# Patient Record
Sex: MALE | Race: OTHER | NOT HISPANIC OR LATINO | Employment: FULL TIME | URBAN - METROPOLITAN AREA
[De-identification: names, ages, dates, MRNs, and addresses within clinical notes are randomized per-mention and may not be internally consistent; named-entity substitution may affect disease eponyms.]

---

## 2020-10-23 ENCOUNTER — APPOINTMENT (OUTPATIENT)
Dept: RADIOLOGY | Age: 25
End: 2020-10-23
Payer: COMMERCIAL

## 2020-10-23 ENCOUNTER — OFFICE VISIT (OUTPATIENT)
Dept: URGENT CARE | Age: 25
End: 2020-10-23
Payer: COMMERCIAL

## 2020-10-23 VITALS
HEIGHT: 63 IN | SYSTOLIC BLOOD PRESSURE: 125 MMHG | RESPIRATION RATE: 16 BRPM | HEART RATE: 81 BPM | TEMPERATURE: 97.8 F | WEIGHT: 129 LBS | OXYGEN SATURATION: 100 % | BODY MASS INDEX: 22.86 KG/M2 | DIASTOLIC BLOOD PRESSURE: 51 MMHG

## 2020-10-23 DIAGNOSIS — M79.672 PAIN IN BOTH FEET: Primary | ICD-10-CM

## 2020-10-23 DIAGNOSIS — M79.672 PAIN IN BOTH FEET: ICD-10-CM

## 2020-10-23 DIAGNOSIS — M25.571 BILATERAL ANKLE PAIN, UNSPECIFIED CHRONICITY: ICD-10-CM

## 2020-10-23 DIAGNOSIS — M79.671 PAIN IN BOTH FEET: Primary | ICD-10-CM

## 2020-10-23 DIAGNOSIS — M25.572 BILATERAL ANKLE PAIN, UNSPECIFIED CHRONICITY: ICD-10-CM

## 2020-10-23 DIAGNOSIS — M79.671 PAIN IN BOTH FEET: ICD-10-CM

## 2020-10-23 PROCEDURE — 73630 X-RAY EXAM OF FOOT: CPT

## 2020-10-23 PROCEDURE — G0382 LEV 3 HOSP TYPE B ED VISIT: HCPCS | Performed by: PHYSICIAN ASSISTANT

## 2020-10-23 PROCEDURE — 73610 X-RAY EXAM OF ANKLE: CPT

## 2020-10-23 RX ORDER — IBUPROFEN 600 MG/1
600 TABLET ORAL EVERY 6 HOURS PRN
Qty: 30 TABLET | Refills: 0 | Status: SHIPPED | OUTPATIENT
Start: 2020-10-23

## 2021-02-27 ENCOUNTER — OFFICE VISIT (OUTPATIENT)
Dept: URGENT CARE | Age: 26
End: 2021-02-27
Payer: COMMERCIAL

## 2021-02-27 VITALS
DIASTOLIC BLOOD PRESSURE: 67 MMHG | WEIGHT: 136 LBS | HEIGHT: 63 IN | OXYGEN SATURATION: 98 % | HEART RATE: 92 BPM | RESPIRATION RATE: 18 BRPM | BODY MASS INDEX: 24.1 KG/M2 | SYSTOLIC BLOOD PRESSURE: 127 MMHG | TEMPERATURE: 97 F

## 2021-02-27 DIAGNOSIS — Z02.4 DRIVER'S PERMIT PHYSICAL EXAMINATION: Primary | ICD-10-CM

## 2021-02-27 NOTE — PROGRESS NOTES
[address]        NAME: Mike Maldonado is a 26 y o  male  : 1995    MRN: [id number]  DATE: 2021  TIME: 12:21 PM    Assessment and Plan   's permit physical examination [Z02 4]  1  's permit physical examination           Patient Instructions         Chief Complaint     Chief Complaint   Patient presents with    Annual Exam     LE[employer] PERMIT          History of Present Illness       Pt presents for drivers permit physical   She answers no to all ROS questions      Review of Systems   Review of Systems   Constitutional: Negative  Negative for chills, diaphoresis, fatigue, fever and unexpected weight change  HENT: Negative  Negative for hearing loss, nosebleeds, sinus pressure, sinus pain, sore throat, trouble swallowing and voice change  Eyes: Negative  Negative for visual disturbance  Respiratory: Negative  Negative for chest tightness, shortness of breath and wheezing  Cardiovascular: Negative  Negative for chest pain and palpitations  Gastrointestinal: Negative  Negative for abdominal pain, diarrhea, nausea and vomiting  Endocrine: Negative  Genitourinary: Negative  Negative for dysuria  Musculoskeletal: Negative  Negative for back pain and neck pain  Skin: Negative  Negative for pallor and rash  Allergic/Immunologic: Negative  Neurological: Negative  Negative for dizziness, seizures, syncope, weakness, light-headedness, numbness and headaches  Hematological: Negative  Psychiatric/Behavioral: Negative            Current Medications       Current Outpatient Medications:     ibuprofen (MOTRIN) 600 mg tablet, Take 1 tablet (600 mg total) by mouth every 6 (six) hours as needed for mild pain (Patient not taking: Reported on 2021), Disp: 30 tablet, Rfl: 0    Current Allergies     Allergies as of 2021    (No Known Allergies)            The following portions of the patient's history were reviewed and updated as appropriate: allergies, current medications, past family history, past medical history, past social history, past surgical history and problem list      No past medical history on file  Past Surgical History:   Procedure Laterality Date    CLUB FOOT RELEASE         No family history on file  Medications have been verified  Objective   /67   Pulse 92   Temp (!) 97 °F (36 1 °C)   Resp 18   Ht 5' 3" (1 6 m)   Wt 61 7 kg (136 lb)   SpO2 98%   BMI 24 09 kg/m²        Physical Exam     Physical Exam  Vitals signs and nursing note reviewed  Constitutional:       General: He is not in acute distress  Appearance: He is well-developed  He is not diaphoretic  HENT:      Head: Normocephalic and atraumatic  Right Ear: External ear normal       Left Ear: External ear normal       Nose: Nose normal       Mouth/Throat:      Pharynx: No oropharyngeal exudate  Eyes:      Conjunctiva/sclera: Conjunctivae normal       Pupils: Pupils are equal, round, and reactive to light  Neck:      Musculoskeletal: Normal range of motion and neck supple  Cardiovascular:      Rate and Rhythm: Normal rate and regular rhythm  Heart sounds: Normal heart sounds  Pulmonary:      Effort: Pulmonary effort is normal  No respiratory distress  Breath sounds: Normal breath sounds  No wheezing or rales  Musculoskeletal: Normal range of motion  General: No tenderness or deformity  Lymphadenopathy:      Cervical: No cervical adenopathy  Skin:     General: Skin is warm  Capillary Refill: Capillary refill takes less than 2 seconds  Coloration: Skin is not pale  Findings: No rash  Neurological:      Mental Status: He is alert and oriented to person, place, and time  Cranial Nerves: No cranial nerve deficit  Sensory: No sensory deficit  Motor: No abnormal muscle tone        Coordination: Coordination normal

## 2021-05-18 ENCOUNTER — OFFICE VISIT (OUTPATIENT)
Dept: URGENT CARE | Age: 26
End: 2021-05-18

## 2021-05-18 VITALS
HEIGHT: 63 IN | HEART RATE: 94 BPM | WEIGHT: 124 LBS | DIASTOLIC BLOOD PRESSURE: 58 MMHG | RESPIRATION RATE: 16 BRPM | OXYGEN SATURATION: 98 % | BODY MASS INDEX: 21.97 KG/M2 | SYSTOLIC BLOOD PRESSURE: 113 MMHG | TEMPERATURE: 98 F

## 2021-05-18 DIAGNOSIS — S62.356A CLOSED NONDISPLACED FRACTURE OF SHAFT OF FIFTH METACARPAL BONE OF RIGHT HAND, INITIAL ENCOUNTER: Primary | ICD-10-CM

## 2021-05-18 PROCEDURE — 29125 APPL SHORT ARM SPLINT STATIC: CPT | Performed by: PHYSICIAN ASSISTANT

## 2021-05-18 PROCEDURE — G0382 LEV 3 HOSP TYPE B ED VISIT: HCPCS | Performed by: PHYSICIAN ASSISTANT

## 2021-05-18 RX ORDER — ACETAMINOPHEN AND CODEINE PHOSPHATE 300; 30 MG/1; MG/1
1 TABLET ORAL EVERY 4 HOURS PRN
Qty: 15 TABLET | Refills: 0 | Status: SHIPPED | OUTPATIENT
Start: 2021-05-18 | End: 2021-05-18

## 2021-05-18 RX ORDER — ACETAMINOPHEN AND CODEINE PHOSPHATE 300; 30 MG/1; MG/1
1 TABLET ORAL EVERY 4 HOURS PRN
Qty: 15 TABLET | Refills: 0 | Status: SHIPPED | OUTPATIENT
Start: 2021-05-18

## 2021-05-18 NOTE — PATIENT INSTRUCTIONS
Motrin and/or Tylenol as needed for mild-to-moderate pain   Tylenol No  3 as needed for severe pain   follow-up with Orthopedics   Stay in splint until seen by orthopedics  Follow up with PCP in 3-5 days  Proceed to  ER if symptoms worsen  Hand Fracture   AMBULATORY CARE:   A hand fracture  is a break in a bone in your hand  Common signs and symptoms of a hand fracture:   · Pain or tenderness    · Swelling, bruising, or a bump    · Problems moving your hand    · Hand shape is not normal    · Knuckle looks sunken in    Seek care immediately if:   · You have severe pain that does not get better, even with pain medicine  · Your injured hand or forearm is cold, numb, or pale  · Your cast or splint gets wet, damaged, or comes off  Call your doctor or hand specialist if:   · You have new sores around your cast or splint  · You notice a bad smell coming from under your cast     · You have questions or concerns about your condition or care  Treatment  may include any of the following:  · A cast or splint  on your hand, wrist, and lower arm will prevent movement while your hand heals  · NSAIDs  help decrease swelling and pain or fever  This medicine is available with or without a doctor's order  NSAIDs can cause stomach bleeding or kidney problems in certain people  If you take blood thinner medicine, always ask your healthcare provider if NSAIDs are safe for you  Always read the medicine label and follow directions  · Acetaminophen  decreases pain and fever  It is available without a doctor's order  Ask how much to take and how often to take it  Follow directions  Read the labels of all other medicines you are using to see if they also contain acetaminophen, or ask your doctor or pharmacist  Acetaminophen can cause liver damage if not taken correctly  Do not use more than 4 grams (4,000 milligrams) total of acetaminophen in one day  · Prescription pain medicine  may be given   Ask your healthcare provider how to take this medicine safely  Some prescription pain medicines contain acetaminophen  Do not take other medicines that contain acetaminophen without talking to your healthcare provider  Too much acetaminophen may cause liver damage  Prescription pain medicine may cause constipation  Ask your healthcare provider how to prevent or treat constipation  · Closed reduction  is used to put the bone back into the correct position without surgery  · Open reduction surgery  may be needed to put the bone back into the correct position  Wires, pins, plates, or screws may be used to keep the broken pieces lined up correctly  Manage your symptoms:   · Wear a splint as directed  Do not remove the splint until you follow up with your healthcare provider or hand specialist     · Apply ice  on your hand for 15 to 20 minutes every hour or as directed  Use an ice pack, or put crushed ice in a plastic bag  Cover it with a towel before you apply it to your skin  Ice helps prevent tissue damage and decreases swelling and pain  · Elevate  your hand above the level of your heart as often as you can  This will help decrease swelling and pain  Prop your hand on pillows or blankets to keep it elevated comfortably  · Go to physical therapy as directed  A physical therapist teaches you exercises to help improve movement and strength and to decrease pain  Bathing with a cast or splint:  Your healthcare provider will tell you when it is okay to take a bath or shower  Do not let your cast or splint get wet  Before bathing, cover the cast or splint with a plastic bag  Tape the bag to your skin above the cast or splint to seal out water  Keep your hand out of the water in case the bag breaks or tears  Cast or splint care:   · Check the skin around the cast or splint for redness or sores every day  · Do not push down or lean on any part of the cast or splint      · Do not use a sharp or pointed object to scratch your skin under the cast or splint  Activity:  You may not be able to drive for up to 2 weeks  Ask when it is safe for you to drive and return to other activities, such as sports  Follow up with your doctor or hand specialist as directed: You may need to return to have your cast, splint, or stitches removed  Write down your questions so you remember to ask them during your visits  © Copyright [address] Information is for End User's use only and may not be sold, redistributed or otherwise used for commercial purposes  All illustrations and images included in CareNotes® are the copyrighted property of A D A M , Inc  or [address] [place], [place] [address]kai   The above information is an  only  It is not intended as medical advice for individual conditions or treatments  Talk to your doctor, nurse or pharmacist before following any medical regimen to see if it is safe and effective for you

## 2021-05-18 NOTE — PROGRESS NOTES
[address] [facility]        NAME: Ioana Corey is a 23 y o  male  : 1995    MRN: [id number]  DATE: May 18, 2021  TIME: 6:25 PM    Assessment and Plan   Closed nondisplaced fracture of shaft of fifth metacarpal bone of right hand, initial encounter [S62 356A]  1  Closed nondisplaced fracture of shaft of fifth metacarpal bone of right hand, initial encounter  XR hand 3+ vw right    XR wrist 3+ vw right    Ambulatory referral to Orthopedic Surgery    Splint    Splint application    Sling    acetaminophen-codeine (TYLENOL #3) 300-30 mg per tablet    DISCONTINUED: acetaminophen-codeine (TYLENOL #3) 300-30 mg per tablet         Patient Instructions      Motrin and/or Tylenol as needed for mild-to-moderate pain   Tylenol No  3 as needed for severe pain   follow-up with Orthopedics   Stay in splint until seen by orthopedics  Follow up with PCP in 3-5 days  Proceed to  ER if symptoms worsen  Chief Complaint     Chief Complaint   Patient presents with    Wrist Pain     pt states his daughter locked herself in a room and he broke the doorknob to get to her, and he injured his right wrist/hand         History of Present Illness       12year-old male presents with a right hand injury  Patient reports that his daughter locked herself into the room and he had a break into room so he hit the door handle with his right hand injuring it  Patient reports that it did this morning continues to have pain  Denies any numbness or tingling  No other injuries reported  Hand Injury   The incident occurred 3 to 6 hours ago  The incident occurred at home  The injury mechanism was a direct blow  The pain is present in the right hand  The quality of the pain is described as aching  The pain does not radiate  The pain is moderate  The pain has been constant since the incident  Pertinent negatives include no chest pain, muscle weakness, numbness or tingling  Nothing aggravates the symptoms   He has tried nothing for the symptoms  The treatment provided no relief  Review of Systems   Review of Systems   Constitutional: Negative  HENT: Negative  Respiratory: Negative  Cardiovascular: Negative  Negative for chest pain  Gastrointestinal: Negative  Musculoskeletal: Negative  Skin: Negative  Neurological: Negative  Negative for tingling and numbness  Current Medications       Current Outpatient Medications:     acetaminophen-codeine (TYLENOL #3) 300-30 mg per tablet, Take 1 tablet by mouth every 4 (four) hours as needed for moderate pain, Disp: 15 tablet, Rfl: 0    ibuprofen (MOTRIN) 600 mg tablet, Take 1 tablet (600 mg total) by mouth every 6 (six) hours as needed for mild pain (Patient not taking: Reported on 2/27/2021), Disp: 30 tablet, Rfl: 0    Current Allergies     Allergies as of 05/18/2021    (No Known Allergies)            The following portions of the patient's history were reviewed and updated as appropriate: allergies, current medications, past family history, past medical history, past social history, past surgical history and problem list      History reviewed  No pertinent past medical history  Past Surgical History:   Procedure Laterality Date    CLUB FOOT RELEASE         History reviewed  No pertinent family history  Medications have been verified  Objective   /58   Pulse 94   Temp 98 °F (36 7 °C)   Resp 16   Ht 5' 3" (1 6 m)   Wt 56 2 kg (124 lb)   SpO2 98%   BMI 21 97 kg/m²   No LMP for male patient  Physical Exam     Physical Exam  Vitals signs and nursing note reviewed  Constitutional:       General: He is not in acute distress  Appearance: He is well-developed  HENT:      Head: Normocephalic and atraumatic  Right Ear: External ear normal       Left Ear: External ear normal       Nose: Nose normal    Eyes:      General:         Right eye: No discharge  Left eye: No discharge        Conjunctiva/sclera: Conjunctivae normal    Neck:      Musculoskeletal: Normal range of motion and neck supple  Cardiovascular:      Rate and Rhythm: Normal rate and regular rhythm  Pulmonary:      Effort: Pulmonary effort is normal  No respiratory distress  Musculoskeletal:      Right hand: He exhibits decreased range of motion, tenderness, bony tenderness and swelling  He exhibits normal two-point discrimination, normal capillary refill, no deformity and no laceration  Normal sensation noted  Hands:    Skin:     General: Skin is warm and dry  Neurological:      Mental Status: He is alert and oriented to person, place, and time  x-ray reviewed  Fracture noted to 5th metacarpal    Splint application    Date/Time: 5/18/2021 6:17 PM  Performed by: Cherise Oliver PA-C  Authorized by: Aaron Fernando PA-C   Universal Protocol:  Consent: Verbal consent obtained  Risks and benefits: risks, benefits and alternatives were discussed  Consent given by: patient  Patient understanding: patient states understanding of the procedure being performed      Pre-procedure details:     Sensation:  Normal    Skin color:   normal  Procedure details:     Laterality:  Right    Location:  Hand    Hand:  R hand    Strapping: yes  Cast type:  Short arm      Splint type:  Ulnar gutter    Supplies:  Elastic bandage, fiberglass, sling and cotton padding  Post-procedure details:     Pain:  Improved    Sensation:  Normal    Patient tolerance of procedure:   Tolerated well, no immediate complications

## 2021-05-18 NOTE — LETTER
May 18, 2021     Patient: Frannie Ortega   YOB: 1995   Date of Visit: 5/18/2021       To Whom it May Concern:    Frannie Ortega was seen in my clinic on 5/18/2021  He may return to work on 05/22/2021  Patient may return sooner if symptoms improve  If you have any questions or concerns, please don't hesitate to call  Sincerely,          Ammon Fernando PA-C        CC: No Recipients

## 2021-05-19 VITALS — BODY MASS INDEX: 21.97 KG/M2 | HEIGHT: 63 IN

## 2021-05-19 DIAGNOSIS — F48.9 MENTAL HEALTH PROBLEM: ICD-10-CM

## 2021-05-19 DIAGNOSIS — S62.356A CLOSED NONDISPLACED FRACTURE OF SHAFT OF FIFTH METACARPAL BONE OF RIGHT HAND, INITIAL ENCOUNTER: ICD-10-CM

## 2021-05-19 DIAGNOSIS — F48.9 POOR MENTAL HEALTH: ICD-10-CM

## 2021-05-19 DIAGNOSIS — S62.316A CLOSED DISPLACED FRACTURE OF BASE OF FIFTH METACARPAL BONE OF RIGHT HAND, INITIAL ENCOUNTER: Primary | ICD-10-CM

## 2021-05-19 PROCEDURE — 29075 APPL CST ELBW FNGR SHORT ARM: CPT | Performed by: ORTHOPAEDIC SURGERY

## 2021-05-19 PROCEDURE — 99204 OFFICE O/P NEW MOD 45 MIN: CPT | Performed by: ORTHOPAEDIC SURGERY

## 2021-05-19 NOTE — LETTER
May 19, 2021     Patient: Oksana Arreguin   YOB: 1995   Date of Visit: 5/19/2021       To Whom it May Concern:    Oksana Arreguin is under my professional care  He was seen in my office on 5/19/2021  He may return to work with no lifting greater than 1 pound with the right upper extremity  If you have any questions or concerns, please don't hesitate to call           Sincerely,          Corazon Allan MD        CC: No Recipients

## 2021-05-19 NOTE — PROGRESS NOTES
Chief Complaint       Right hand pain    History of Present Illness     Benito Cross is a 23 y o  right hand dominant male  Who presents the office today for evaluation of his right hand  Patient sustained injury on 05/18/2021  He states he punched a doorknob after his daughter locked herself in her room  He was seen in the urgent care yesterday where x-rays were obtained and he was placed in a splint  Patient has pain today about the 5th metacarpal   He reports he has been wearing his splint but did get the dressing wet which brought him in today  Patient reports he has had no prior injuries to the right hand  He is very active and enjoys playing basketball and boxing  He does report that he has some numbness today to the dorsal ulnar aspect of the hand  He is currently working as a  at the [employer]  History reviewed  No pertinent past medical history  Past Surgical History:   Procedure Laterality Date    CLUB FOOT RELEASE         No Known Allergies    Current Outpatient Medications on File Prior to Visit   Medication Sig Dispense Refill    acetaminophen-codeine (TYLENOL #3) 300-30 mg per tablet Take 1 tablet by mouth every 4 (four) hours as needed for moderate pain 15 tablet 0    ibuprofen (MOTRIN) 600 mg tablet Take 1 tablet (600 mg total) by mouth every 6 (six) hours as needed for mild pain (Patient not taking: Reported on 2/27/2021) 30 tablet 0     No current facility-administered medications on file prior to visit  Social History     Tobacco Use    Smoking status: Current Every Day Smoker     Packs/day: 2 00     Types: Cigars    Smokeless tobacco: Never Used   Substance Use Topics    Alcohol use: Yes     Comment: occasionally     Drug use: Yes     Types: Marijuana     Comment: daily use       History reviewed  No pertinent family history  Review of Systems     As stated in the HPI  All other systems were reviewed and are negative        Physical Exam     Ht 5' 3" (1 6 m)   BMI 21 97 kg/m²     GENERAL: This is a well-developed, well-nourished, age-appropriate patient in no acute distress  The patient is alert and oriented x3  Pleasant and cooperative  Eyes: Anicteric sclerae  Extraocular movements appear intact  HENT: Nares are patent with no drainage  Lungs: There is equal chest rise on inspection  Breathing is non-labored with no audible wheezing  Cardiovascular: No cyanosis  No upper extremity lymphadema  Skin: Skin is warm to touch  No obvious skin lesions or rashes other than described below  Neurologic: No ataxia  Psychiatric: Mood and affect are appropriate  Right hand:  Skin intact, no abrasions   No erythema noted   Mild swelling noted   Mild ecchymosis noted to the palmar aspect of the ulnar hand  No malrotation or coronal plane deformity   5 degree extensor lag at the PIP  Flexion limited by pain of small finger   Tender at the fracture site at 5th metacarpal base  Diminished but intact sensation to the ring and small finger  Sensation normal in median/radial nerve distributions  Brisk capillary refill noted to all digits     Data Review     Labs:  None today    Electrodiagnostic Testing:   none today    Imaging:   x-rays of the right hand obtained on 05/18/2021 were personally reviewed by me in the office and demonstrate comminuted minimally displaced 5th metacarpal proximal shaft fracture    Assessment and Plan      Diagnoses and all orders for this visit:    Closed displaced fracture of base of fifth metacarpal bone of right hand, initial encounter  -     Fracture / Dislocation Treatment            25-year-old male with right 5th metacarpal proximal shaft fracture  Patient and I discussed that his fracture is in appropriate alignment  based on his x-rays and that he has no malrotation on clinical exam    We discussed that I do not feel he requires any surgical intervention at this time    We can treat him non operatively in a cast for the next 4 weeks  Patient and I discussed smoking cessation and that I think he can do to limit his nicotine use will help him with bone healing  He was placed into a  modified ulnar gutter today  We discussed cast care instructions  I will allow him to return to work with no lifting greater than 1 pound with the right upper extremity  We did discuss if he notices any increase in rotation of the small finger that he should follow-up with the sooner that his previously scheduled appointment  Otherwise I will see him back in 4 weeks time for re-evaluation, cast removal, and transition into a brace  On a side note, prior to leaving the patient did ask if it would be possible to get a referral for psych  He states he just wants to speak to someone as he notices he gets angry easily  He denies suicidal thoughts or thoughts of harming others  Referral was placed and patient also provided with a list of providers  Patient urged to go to the ED if he does develop thoughts of harming himself or others        Follow Up: 4 weeks     To Do Next Visit: cast off, re-eval    PROCEDURES PERFORMED:  Fracture / Dislocation Treatment    Date/Time: 5/19/2021 11:51 AM  Performed by: Rowdy Britton PA-C  Authorized by: Rowdy Britton PA-C     Patient Location:  Clinic  Verbal consent obtained?: Yes    Injury location:  Hand  Location details:  Right hand  Injury type:  Fracture  Distal perfusion: normal    Neurological function: diminished    Range of motion: reduced    Manipulation performed?: No    Immobilization:  Cast  Cast type:  Short arm (modified ulnar gutter )  Supplies used:  Cotton padding and fiberglass  Distal perfusion: normal    Neurological function: diminished    Range of motion: unchanged    Patient tolerance:  Patient tolerated the procedure well with no immediate complications

## 2021-06-16 VITALS
BODY MASS INDEX: 21.97 KG/M2 | WEIGHT: 124 LBS | DIASTOLIC BLOOD PRESSURE: 63 MMHG | SYSTOLIC BLOOD PRESSURE: 107 MMHG | HEIGHT: 63 IN | RESPIRATION RATE: 18 BRPM | HEART RATE: 76 BPM

## 2021-06-16 DIAGNOSIS — S62.316A CLOSED DISPLACED FRACTURE OF BASE OF FIFTH METACARPAL BONE OF RIGHT HAND, INITIAL ENCOUNTER: Primary | ICD-10-CM

## 2021-06-16 PROCEDURE — 99213 OFFICE O/P EST LOW 20 MIN: CPT | Performed by: ORTHOPAEDIC SURGERY

## 2021-06-16 NOTE — LETTER
June 16, 2021     Patient: Ioana Corey   YOB: 1995   Date of Visit: 6/16/2021       To Whom it May Concern:    Ioana Corey is under my professional care  He was seen in my office on 6/16/2021  He may return to work with no restrictions on 6/17/21  If you have any questions or concerns, please don't hesitate to call           Sincerely,          Godwin Matt MD        CC: No Recipients

## 2021-06-16 NOTE — PATIENT INSTRUCTIONS
Exercises:  - Keep elbow at the side  Rotate palm up and palm down  Assist the rotation with your uninjured hand  Hold for 30 seconds at the point where you feel tightness and minimal pain  - Keep elbow on the table  Flex wrist down and back  Assist the flexion and back bending with your uninjured hand  Hold for 30 seconds at the point where you feel tightness and minimal pain  - Clench fist on the injured hand  Assist the clench with your uninjured hand  Hold for 30 seconds at the point where you feel tightness and minimal pain

## 2021-06-16 NOTE — PROGRESS NOTES
Chief Complaint      right hand/wrist pain    History of Present Illness     Jennifer Yuan is a 23 y o  right hand dominant male  Who presents the office today for follow-up evaluation of his right 5th metacarpal fracture  Patient sustained injury about 4 weeks ago  He notes he did remove his cast by himself yesterday due to the cast getting wet  Notes no new injuries  He reports no numbness or tingling  He does feel the stiffness about the hand and fingers today  He does have concern over the prominence of his ulnar head but does attribute any irritation cast rubbing over the area  Patient has not return to work but states he would like to return to work as of tomorrow if possible  He states he does have to lift 50 lb repetitively for his job  History reviewed  No pertinent past medical history  Past Surgical History:   Procedure Laterality Date    CLUB FOOT RELEASE         No Known Allergies    Current Outpatient Medications on File Prior to Visit   Medication Sig Dispense Refill    acetaminophen-codeine (TYLENOL #3) 300-30 mg per tablet Take 1 tablet by mouth every 4 (four) hours as needed for moderate pain (Patient not taking: Reported on 6/16/2021) 15 tablet 0    ibuprofen (MOTRIN) 600 mg tablet Take 1 tablet (600 mg total) by mouth every 6 (six) hours as needed for mild pain (Patient not taking: Reported on 2/27/2021) 30 tablet 0     No current facility-administered medications on file prior to visit  Social History     Tobacco Use    Smoking status: Current Every Day Smoker     Packs/day: 2 00     Types: Cigars    Smokeless tobacco: Never Used   Vaping Use    Vaping Use: Never used   Substance Use Topics    Alcohol use: Yes     Comment: occasionally     Drug use: Yes     Types: Marijuana     Comment: daily use       History reviewed  No pertinent family history  Review of Systems     As stated in the HPI   All other systems were reviewed and are negative  Physical Exam     /63   Pulse 76   Resp 18   Ht 5' 3" (1 6 m)   Wt 56 2 kg (124 lb)   BMI 21 97 kg/m²     GENERAL: This is a well-developed, well-nourished, age-appropriate patient in no acute distress  The patient is alert and oriented x3  Pleasant and cooperative  Eyes: Anicteric sclerae  Extraocular movements appear intact  HENT: Nares are patent with no drainage  Lungs: There is equal chest rise on inspection  Breathing is non-labored with no audible wheezing  Cardiovascular: No cyanosis  No upper extremity lymphadema  Skin: Skin is warm to touch  No obvious skin lesions or rashes other than described below  Neurologic: No ataxia  Psychiatric: Mood and affect are appropriate  Right hand   Skin intact  No erythema or ecchymosis noted   Ulnar prominence is equal to the contralateral side with minimal swelling   No malrotation or coronal plane deformity noted of small finger   DPC 0   No extensor lag noted   Mildly tender to palpate fracture site at 5th metacarpal shaft   5/5 Motor to the APB, FDI, FDP2, FDP5, EDC  Sensation intact to light touch in the median, radial, and ulnar nerve distribution  Brisk capillary refill noted to all digits    Data Review     Labs:  none today    Electrodiagnostic Testing:   none today    Imaging:  none today    Assessment and Plan      Diagnoses and all orders for this visit:    Closed displaced fracture of base of fifth metacarpal bone of right hand, initial encounter  -     Cock Up Wrist Splint          81-year-old male with healing right 5th metacarpal fracture  Patient and I discussed that he is healing well based on his clinical exam   I do not have any concerned over his ulnar head prominence  Irritation around this area is likely due to the cast rubbing  We discussed that he has no evidence of any fractures or injuries to the ulnar head based on previous x-rays    At this point he is well-healed enough to perform activities as tolerated  Patient wishes to be transition to a removable brace which was provided to him today in the office  I have also provided him with exercises for him to continue to work on his range of motion  He may return to work as of 06/17/2021 with no restrictions  I will see him back on as-needed basis or if he continues to have symptoms in 4 weeks time      Follow Up:  P r n / 4 weeks     To Do Next Visit:     PROCEDURES PERFORMED:  Procedures  No Procedures performed today    Scribe Attestation    I,:  Miranda Polanco MA am acting as a scribe while in the presence of the attending physician :       I,:  Belgica Ring MD personally performed the services described in this documentation    as scribed in my presence :

## 2023-09-14 ENCOUNTER — APPOINTMENT (EMERGENCY)
Dept: RADIOLOGY | Facility: HOSPITAL | Age: 28
End: 2023-09-14
Payer: COMMERCIAL

## 2023-09-14 ENCOUNTER — HOSPITAL ENCOUNTER (EMERGENCY)
Facility: HOSPITAL | Age: 28
Discharge: HOME | End: 2023-09-14
Attending: EMERGENCY MEDICINE
Payer: COMMERCIAL

## 2023-09-14 VITALS
OXYGEN SATURATION: 100 % | SYSTOLIC BLOOD PRESSURE: 143 MMHG | RESPIRATION RATE: 18 BRPM | HEART RATE: 74 BPM | DIASTOLIC BLOOD PRESSURE: 82 MMHG | TEMPERATURE: 98 F

## 2023-09-14 DIAGNOSIS — Z48.02 ENCOUNTER FOR REMOVAL OF SUTURES: Primary | ICD-10-CM

## 2023-09-14 DIAGNOSIS — R22.9 LOCALIZED SOFT TISSUE SWELLING: ICD-10-CM

## 2023-09-14 DIAGNOSIS — Z76.89 ENCOUNTER TO ESTABLISH CARE: ICD-10-CM

## 2023-09-14 DIAGNOSIS — M25.531 ACUTE PAIN OF RIGHT WRIST: ICD-10-CM

## 2023-09-14 PROCEDURE — 99281 EMR DPT VST MAYX REQ PHY/QHP: CPT

## 2023-09-14 PROCEDURE — 73110 X-RAY EXAM OF WRIST: CPT

## 2023-09-14 PROCEDURE — 99284 EMERGENCY DEPT VISIT MOD MDM: CPT

## 2023-09-14 RX ORDER — AMOXICILLIN AND CLAVULANATE POTASSIUM 875; 125 MG/1; MG/1
1 TABLET, FILM COATED ORAL ONCE
Status: COMPLETED | OUTPATIENT
Start: 2023-09-14 | End: 2023-09-14

## 2023-09-14 RX ORDER — AMOXICILLIN AND CLAVULANATE POTASSIUM 875; 125 MG/1; MG/1
1 TABLET, FILM COATED ORAL EVERY 12 HOURS
Qty: 10 TABLET | Refills: 0 | Status: SHIPPED | OUTPATIENT
Start: 2023-09-14 | End: 2023-09-19

## 2023-09-14 RX ADMIN — AMOXICILLIN AND CLAVULANATE POTASSIUM 1 TABLET: 875; 125 TABLET, FILM COATED ORAL at 03:12

## 2023-09-14 RX ADMIN — AMOXICILLIN AND CLAVULANATE POTASSIUM 1 TABLET: 875; 125 TABLET, FILM COATED ORAL at 03:22

## 2023-09-14 NOTE — ED PROVIDER NOTES
History  Chief Complaint   Patient presents with   • Suture / Staple Removal     Patient reports wanting sutures removed from wrist. Had stitches placed 10 days ago for laceration on wrist.      Patient is a 45-year-old male with no significant PMH presenting for evaluation of suture removal.  Review of the patient's chart indicates he was seen 10 days ago at [facility] after punching his right hand through glass. He had multiple lacerations noted to the right hand and right wrist requiring sutures. He notes the wounds have been healing appropriately. He denies fevers, chills, new redness or swelling or warmth or drainage from the wounds. He notes mild ongoing pain to the laceration on the lateral aspect of his right wrist.  He has no other complaints at this time. History provided by:  Patient   used: No        Prior to Admission Medications   Prescriptions Last Dose Informant Patient Reported? Taking?   acetaminophen-codeine (TYLENOL #3) 300-30 mg per tablet   No No   Sig: Take 1 tablet by mouth every 4 (four) hours as needed for moderate pain   Patient not taking: Reported on 6/16/2021   ibuprofen (MOTRIN) 600 mg tablet   No No   Sig: Take 1 tablet (600 mg total) by mouth every 6 (six) hours as needed for mild pain   Patient not taking: Reported on 2/27/2021      Facility-Administered Medications: None       History reviewed. No pertinent past medical history. Past Surgical History:   Procedure Laterality Date   • CLUB FOOT RELEASE         History reviewed. No pertinent family history. I have reviewed and agree with the history as documented.     E-Cigarette/Vaping   • E-Cigarette Use Never User      E-Cigarette/Vaping Substances     Social History     Tobacco Use   • Smoking status: Every Day     Packs/day: 2.00     Types: Cigars, Cigarettes   • Smokeless tobacco: Never   Vaping Use   • Vaping Use: Never used   Substance Use Topics   • Alcohol use: Yes     Comment: occasionally    • Drug use: Yes     Types: Marijuana     Comment: daily use       Review of Systems   Constitutional: Negative for chills and fever. Musculoskeletal: Positive for arthralgias (Notes mild wrist pain when trying to flex or play basketball). Negative for back pain, gait problem and joint swelling. Skin: Positive for wound (healing lacs to R hand and wrist). Negative for color change, pallor and rash. Neurological: Negative for weakness and numbness. All other systems reviewed and are negative. Physical Exam  Physical Exam  Vitals and nursing note reviewed. Constitutional:       General: He is not in acute distress. Appearance: Normal appearance. He is well-developed and normal weight. He is not ill-appearing, toxic-appearing or diaphoretic. HENT:      Head: Normocephalic and atraumatic. Nose: Nose normal.      Mouth/Throat:      Lips: Pink. Mouth: Mucous membranes are moist.   Eyes:      Extraocular Movements: Extraocular movements intact. Conjunctiva/sclera: Conjunctivae normal.   Cardiovascular:      Rate and Rhythm: Normal rate and regular rhythm. Pulses: Normal pulses. Radial pulses are 2+ on the right side and 2+ on the left side. Pulmonary:      Effort: Pulmonary effort is normal. No tachypnea or respiratory distress. Musculoskeletal:         General: Normal range of motion. Right shoulder: Normal.      Left shoulder: Normal.      Right elbow: Normal.      Left elbow: Normal.      Right forearm: Normal. No swelling, edema, deformity or bony tenderness. Left forearm: Normal.      Right wrist: Swelling (Localized area of mild swelling and mild tenderness with scant erythema overlying the ulnar aspect of the right breast where 4 sutures were placed. With palpation, scant serous fluid drains. ) present. No deformity, effusion, tenderness or bony tenderness. Normal range of motion. Normal pulse.       Left wrist: Normal.      Right hand: Normal. No swelling, deformity or bony tenderness. Normal range of motion. Normal strength. Normal sensation. There is no disruption of two-point discrimination. Normal capillary refill. Normal pulse. Left hand: Normal.      Cervical back: Normal range of motion and neck supple. Skin:     General: Skin is warm and dry. Capillary Refill: Capillary refill takes less than 2 seconds. Findings: Wound (Healing lacerations to dorsal aspect of right hand and lateral aspect of right wrist.  One of the laceration sites has 4 sutures that were removed with scant erythema, swelling, and tenderness localized to the area. No purulent drainage. No warmth.) present. No rash. Neurological:      General: No focal deficit present. Mental Status: He is alert and oriented to person, place, and time. Mental status is at baseline. GCS: GCS eye subscore is 4. GCS verbal subscore is 5. GCS motor subscore is 6. Vital Signs  ED Triage Vitals [09/14/23 0246]   Temperature Pulse Respirations Blood Pressure SpO2   98 °F (36.7 °C) 74 18 143/82 100 %      Temp src Heart Rate Source Patient Position - Orthostatic VS BP Location FiO2 (%)   -- -- Sitting Right arm --      Pain Score       No Pain           Vitals:    09/14/23 0246   BP: 143/82   Pulse: 74   Patient Position - Orthostatic VS: Sitting         Visual Acuity      ED Medications  Medications   amoxicillin-clavulanate (AUGMENTIN) 875-125 mg per tablet 1 tablet (1 tablet Oral Given 9/14/23 0312)   amoxicillin-clavulanate (AUGMENTIN) 875-125 mg per tablet 1 tablet (1 tablet Oral Given 9/14/23 0322)       Diagnostic Studies  Results Reviewed     None                 XR wrist 3+ views RIGHT   ED Interpretation by ELVER Flores (09/14 0421)   Soft tissue swelling lateral to distal ulna. No radiopaque foreign body. No bony abnormality identified by me. Procedures  Suture removal    Date/Time: 9/14/2023 3:20 AM    Performed by:  Janine Razo ELVER  Authorized by: ELVER Magana  Universal Protocol:  Consent: Verbal consent obtained. Risks and benefits: risks, benefits and alternatives were discussed  Consent given by: patient  Time out: Immediately prior to procedure a "time out" was called to verify the correct patient, procedure, equipment, support staff and site/side marked as required. Timeout called at: 9/14/2023 3:10 AM.  Patient understanding: patient states understanding of the procedure being performed  Patient identity confirmed: verbally with patient        Patient location:  ED  Location:     Laterality:  Right    Location:  Upper extremity    Upper extremity location:  Wrist    Wrist location:  R wrist  Procedure details: Tools used:  Suture removal kit and scalpel    Wound appearance:  Draining, nonpurulent, tender and pink    Drainage characteristics:  Scant serous drainage    Number of sutures removed:  10  Post-procedure details:     Post-removal:  Antibiotic ointment applied    Patient tolerance of procedure: Tolerated well, no immediate complications             ED Course  ED Course as of 09/14/23 0531   Thu Sep 14, 2023   0250 Triage vital signs within normal limits and stable                                             Medical Decision Making  DDx including but not limited to: suture/staple removal, wound infection, wound dehiscence, cellulitis, abscess, retained foreign body, seroma. #Suture removal  -Vital signs within normal limits and stable. Patient denies fevers, chills, redness or swelling or warmth overlying laceration repair sites. -Exam reveals scant erythema, mild swelling and mild tenderness overlying the ulnar aspect of the right wrist where 4 sutures were placed. On removal of 4 sutures, scant serous drainage. No purulent drainage. Area palpated and no further drainage expressed.  -We will obtain x-ray to further evaluate for retained foreign body.  -Tdap up-to-date  -No bony abnormality on x-ray. No effusion or swelling of wrist joint, localized swelling proximal to wrist joint. Suspect post laceration seroma. We will have patient complete Augmentin twice daily x5 days for skin erythema, swelling, and tenderness. We will have him follow-up with hand surgery for reevaluation and to make certain of resolution of pain. Acute pain of right wrist: acute illness or injury  Encounter for removal of sutures: self-limited or minor problem  Encounter to establish care: self-limited or minor problem  Localized soft tissue swelling: acute illness or injury  Amount and/or Complexity of Data Reviewed  Radiology: ordered and independent interpretation performed. Risk  OTC drugs. Prescription drug management. Disposition  Final diagnoses:   Encounter for removal of sutures   Acute pain of right wrist   Localized soft tissue swelling   Encounter to establish care     Time reflects when diagnosis was documented in both MDM as applicable and the Disposition within this note     Time User Action Codes Description Comment    9/14/2023  4:21 AM ITeam Add [O14.21] Encounter for removal of sutures     9/14/2023  4:21 AM Chanda Plascencia Add [M25.531] Acute pain of right wrist     9/14/2023  4:21 AM Chanda Plascencia Add [R22.9] Localized soft tissue swelling     9/14/2023  4:37 AM ITeam Add [Z76.89] Encounter to establish care       ED Disposition     ED Disposition   Discharge    Condition   Stable    Date/Time   Thu Sep 14, 2023  4:21 AM    Comment   Dossie Reveal discharge to home/self care.                Follow-up Information     Follow up With Specialties Details Why Contact Info Additional Information    Uyen Davidson MD Orthopedic Surgery, Hand Surgery Schedule an appointment as soon as possible for a visit in 1 week For wound re-check [address] [place] [address] Emergency Department Emergency Medicine Go to  If symptoms worsen [address] [facility], [place], [place], [address]          Discharge Medication List as of 9/14/2023  4:28 AM      START taking these medications    Details   amoxicillin-clavulanate (AUGMENTIN) 875-125 mg per tablet Take 1 tablet by mouth every 12 (twelve) hours for 5 days, Starting Thu 9/14/2023, Until Tue 9/19/2023, Normal         CONTINUE these medications which have NOT CHANGED    Details   acetaminophen-codeine (TYLENOL #3) 300-30 mg per tablet Take 1 tablet by mouth every 4 (four) hours as needed for moderate pain, Starting Tue 5/18/2021, Normal      ibuprofen (MOTRIN) 600 mg tablet Take 1 tablet (600 mg total) by mouth every 6 (six) hours as needed for mild pain, Starting Fri 10/23/2020, Normal                 PDMP Review       Value Time User    PDMP Reviewed  Yes 9/14/2023  2:44 AM Chico Valenzuela MD          ED Provider  Electronically Signed by           Jon Garcia, [address] [place] [address]  09/14/23 5534

## 2023-09-14 NOTE — DISCHARGE INSTRUCTIONS
Take the prescribed antibiotic as directed for the full duration of it's course. I referred you to hand surgery for reevaluation of your right wrist wound. Please schedule an appointment with Dr. Indy Mcgregor or the next available provider as soon as possible. Take 650 mg of acetaminophen (Tylenol) every 4 hours and/or 600 mg ibuprofen (Motrin) every 6 hours as needed for pain. Return to the ER if develop fever, chills, new or worsening redness or swelling or warmth of the right wrist, decreased ability to move the right wrist, or new or worsening pain.

## 2023-09-14 NOTE — Clinical Note
Rubia Delarosa was seen and treated in our emergency department on 9/14/2023. Diagnosis:     Amelia Tirplett  is off the rest of the shift today, may return to work on return date. He may return on this date: 09/15/2023         If you have any questions or concerns, please don't hesitate to call.       Roshan Vincent    ______________________________           _______________          _______________  Hospital Representative                              Date                                Time

## 2023-09-14 NOTE — ED ATTENDING ATTESTATION
9/14/2023  ISaira MD, saw and evaluated the patient. I have discussed the patient with the resident/non-physician practitioner and agree with the resident's/non-physician practitioner's findings, Plan of Care, and MDM as documented in the resident's/non-physician practitioner's note, except where noted. All available labs and Radiology studies were reviewed. I was present for key portions of any procedure(s) performed by the resident/non-physician practitioner and I was immediately available to provide assistance. At this point I agree with the current assessment done in the Emergency Department. I have conducted an independent evaluation of this patient a history and physical is as follows: Patient is a 33year old male who is here for suture removal. Was last seen at [address] [place] [address] ED on 9/4/23 for laceration of R wrist and hand. Last Tdap was on 9/4/23. [facility] website checked on this patient and no Rx found. (+) some pain of R wrist with practicing basketball. No fever. (+) swelling noted over sutured area of R dorsal wrist with mild erythema. NVI. Rest of R UE unremarkable. DDx including but not limited to: suture/staple removal, wound infection, wound dehiscence, cellulitis, abscess; doubt necrotizing fasciitis, osteomyelitis' retained foreign body, bleeding, seroma. Will check x-ray and give abx and patient can f/u with hand on call.      ED Course         Critical Care Time  Procedures

## 2023-09-14 NOTE — ED NOTES
Discharge instructions reviewed with patient by Olesya Freitas. Pt ambulated to waiting room by self with negative complications.  Steady gait noted     Jaden Calvin RN  09/14/23 2184

## 2024-03-04 ENCOUNTER — APPOINTMENT (OUTPATIENT)
Dept: URGENT CARE | Age: 29
End: 2024-03-04
Payer: OTHER MISCELLANEOUS

## 2024-03-04 PROCEDURE — G0381 LEV 2 HOSP TYPE B ED VISIT: HCPCS

## 2024-03-04 PROCEDURE — 99282 EMERGENCY DEPT VISIT SF MDM: CPT
